# Patient Record
Sex: MALE | Race: WHITE | NOT HISPANIC OR LATINO | URBAN - METROPOLITAN AREA
[De-identification: names, ages, dates, MRNs, and addresses within clinical notes are randomized per-mention and may not be internally consistent; named-entity substitution may affect disease eponyms.]

---

## 2020-08-27 ENCOUNTER — WEB ENCOUNTER (OUTPATIENT)
Dept: URBAN - METROPOLITAN AREA CLINIC 33 | Facility: CLINIC | Age: 50
End: 2020-08-27

## 2020-08-31 ENCOUNTER — DASHBOARD ENCOUNTERS (OUTPATIENT)
Age: 50
End: 2020-08-31

## 2020-08-31 ENCOUNTER — WEB ENCOUNTER (OUTPATIENT)
Dept: URBAN - METROPOLITAN AREA CLINIC 35 | Facility: CLINIC | Age: 50
End: 2020-08-31

## 2020-08-31 ENCOUNTER — OFFICE VISIT (OUTPATIENT)
Dept: URBAN - METROPOLITAN AREA CLINIC 23 | Facility: CLINIC | Age: 50
End: 2020-08-31
Payer: COMMERCIAL

## 2020-08-31 DIAGNOSIS — Z12.11 COLON CANCER SCREENING: ICD-10-CM

## 2020-08-31 DIAGNOSIS — K51.20 ULCERATIVE PROCTITIS WITHOUT COMPLICATION: ICD-10-CM

## 2020-08-31 PROCEDURE — 99214 OFFICE O/P EST MOD 30 MIN: CPT | Performed by: INTERNAL MEDICINE

## 2020-08-31 PROCEDURE — 99244 OFF/OP CNSLTJ NEW/EST MOD 40: CPT | Performed by: INTERNAL MEDICINE

## 2020-08-31 PROCEDURE — 1036F TOBACCO NON-USER: CPT | Performed by: INTERNAL MEDICINE

## 2020-08-31 PROCEDURE — G9903 PT SCRN TBCO ID AS NON USER: HCPCS | Performed by: INTERNAL MEDICINE

## 2020-08-31 PROCEDURE — G8420 CALC BMI NORM PARAMETERS: HCPCS | Performed by: INTERNAL MEDICINE

## 2020-08-31 PROCEDURE — G9622 NO UNHEAL ETOH USER: HCPCS | Performed by: INTERNAL MEDICINE

## 2020-08-31 PROCEDURE — 3017F COLORECTAL CA SCREEN DOC REV: CPT | Performed by: INTERNAL MEDICINE

## 2020-08-31 PROCEDURE — G8427 DOCREV CUR MEDS BY ELIG CLIN: HCPCS | Performed by: INTERNAL MEDICINE

## 2020-08-31 RX ORDER — SODIUM, POTASSIUM,MAG SULFATES 17.5-3.13G
17.5-13.3-1.6 GM/177ML SOLUTION, RECONSTITUTED, ORAL ORAL AS DIRECTED
Qty: 354 MILLILITER | OUTPATIENT
Start: 2020-08-31

## 2020-08-31 NOTE — PREVIOUS WORKUP REVIEWED
:ENDOSCOPIES:-Colonoscopy 11/11/2009:Inflammation in the rectum. Otherwise normal colon.-EGD 11/11/2009:Normal esophagus. Mild gastropathy in the antrum. Normal duodenum.*Pathology:Random colon-mild focal active colitis. Rectum-focal active colitis, lymphoid hyperplasia, acute inflammatory cells within crypts and lamina propria, no architectural changes to suggest chronic colitis.LABS:IMAGES:-Right upper ultrasound 11/11/2009:Normal.

## 2020-08-31 NOTE — HPI-TODAY'S VISIT:
51-year-old  male with history of ulcerative proctitis in 2009, no medical treatment, presents for 2 weeks history of left-sided abdominal pain and one episode of blood in stool.  Previously he was just doing fine.  No family history of IBD or colon cancer.  He is overdue for colon cancer screening.  Rare use of Advil otherwise no frequent NSAID use.  He takes pantoprazole 20 mg daily. Denies diarrhea.  2 bowel movements per day, formed.

## 2020-09-01 ENCOUNTER — WEB ENCOUNTER (OUTPATIENT)
Dept: URBAN - METROPOLITAN AREA CLINIC 35 | Facility: CLINIC | Age: 50
End: 2020-09-01

## 2020-09-17 ENCOUNTER — OFFICE VISIT (OUTPATIENT)
Dept: URBAN - METROPOLITAN AREA LAB 3 | Facility: LAB | Age: 50
End: 2020-09-17
Payer: COMMERCIAL

## 2020-09-17 DIAGNOSIS — K63.5 BENIGN COLON POLYP: ICD-10-CM

## 2020-09-17 DIAGNOSIS — D12.2 ADENOMA OF ASCENDING COLON: ICD-10-CM

## 2020-09-17 DIAGNOSIS — K63.89 BACTERIAL OVERGROWTH SYNDROME: ICD-10-CM

## 2020-09-17 DIAGNOSIS — R19.7 ACUTE DIARRHEA: ICD-10-CM

## 2020-09-17 DIAGNOSIS — Z87.19 H/O DIVERTICULITIS OF COLON: ICD-10-CM

## 2020-09-17 PROCEDURE — G9937 DIG OR SURV COLSCO: HCPCS | Performed by: INTERNAL MEDICINE

## 2020-09-17 PROCEDURE — 45385 COLONOSCOPY W/LESION REMOVAL: CPT | Performed by: INTERNAL MEDICINE

## 2020-09-17 PROCEDURE — 45380 COLONOSCOPY AND BIOPSY: CPT | Performed by: INTERNAL MEDICINE

## 2020-09-17 RX ORDER — SODIUM, POTASSIUM,MAG SULFATES 17.5-3.13G
17.5-13.3-1.6 GM/177ML SOLUTION, RECONSTITUTED, ORAL ORAL AS DIRECTED
Qty: 354 MILLILITER | Status: ACTIVE | COMMUNITY
Start: 2020-08-31

## 2020-09-22 ENCOUNTER — OFFICE VISIT (OUTPATIENT)
Dept: URBAN - METROPOLITAN AREA CLINIC 33 | Facility: CLINIC | Age: 50
End: 2020-09-22

## 2020-09-26 ENCOUNTER — WEB ENCOUNTER (OUTPATIENT)
Dept: URBAN - METROPOLITAN AREA CLINIC 35 | Facility: CLINIC | Age: 50
End: 2020-09-26

## 2020-09-29 ENCOUNTER — OFFICE VISIT (OUTPATIENT)
Dept: URBAN - METROPOLITAN AREA CLINIC 31 | Facility: CLINIC | Age: 50
End: 2020-09-29

## 2020-09-29 NOTE — HPI-MIGRATED HPI
Surveillance Colonoscopy : 50 year old male patient presents today for a surveillance colonoscopy screening. His last colonoscopy was in -- performed by -- with -- findings. Denies a family h/o colon, gastric, or esophageal cancer/polyps. He currently admits --  bowel movements per --. He admits/denies any melena, mucus, and blood in stools. He denies any fever, chills, nausea, vomiting, abdominal pain. He denies any change in exercise tolerance. He denies any chest pain or shortness of breath.  ;   Abdominal Pain : 50 year old male patient presents today for a surveillance colonoscopy screening. He admits abdominal pain that is located -- and is described as a --. Pain started -- and he states that he has -- episodes per --. Denies/admits any aggravating factors: --. Denies/admits any alleviating factors: --.  He has tried -- (medications) with -- relief of his symptoms.  Denies/admits the use of antibiotics within the last (3-6) months. He states that he has/has not had previous labs, radiology, or procedures. Denies/admits any hospital/ER visits for his pain.  Denies/Admits a family history of colon cancer or diseases/esophageal or gastric cancer or diseases. Admits a previous colonoscopy in 2000, performed by  -- showing findings of --.  He admits/denies a previous EGD or Barium Swallow test (when, by who, and results).;

## 2020-10-06 ENCOUNTER — TELEPHONE ENCOUNTER (OUTPATIENT)
Dept: URBAN - METROPOLITAN AREA CLINIC 23 | Facility: CLINIC | Age: 50
End: 2020-10-06